# Patient Record
Sex: MALE
[De-identification: names, ages, dates, MRNs, and addresses within clinical notes are randomized per-mention and may not be internally consistent; named-entity substitution may affect disease eponyms.]

---

## 2020-10-16 ENCOUNTER — HOSPITAL ENCOUNTER (EMERGENCY)
Dept: HOSPITAL 5 - ED | Age: 40
Discharge: HOME | End: 2020-10-16
Payer: COMMERCIAL

## 2020-10-16 VITALS — DIASTOLIC BLOOD PRESSURE: 86 MMHG | SYSTOLIC BLOOD PRESSURE: 139 MMHG

## 2020-10-16 DIAGNOSIS — M54.6: ICD-10-CM
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
## 2020-10-16 DIAGNOSIS — M79.642: ICD-10-CM
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
## 2020-10-16 DIAGNOSIS — Z88.8: ICD-10-CM

## 2020-10-16 DIAGNOSIS — Z79.899: ICD-10-CM

## 2020-10-16 DIAGNOSIS — F43.10: ICD-10-CM
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
## 2020-10-16 DIAGNOSIS — M54.2: Primary | ICD-10-CM
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
## 2020-10-16 PROCEDURE — 72040 X-RAY EXAM NECK SPINE 2-3 VW: CPT

## 2020-10-16 PROCEDURE — 72100 X-RAY EXAM L-S SPINE 2/3 VWS: CPT

## 2020-10-16 PROCEDURE — 82962 GLUCOSE BLOOD TEST: CPT
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
## 2021-01-11 ENCOUNTER — HOSPITAL ENCOUNTER (EMERGENCY)
Dept: HOSPITAL 5 - ED | Age: 41
Discharge: HOME | End: 2021-01-11
Payer: MEDICARE

## 2021-01-11 VITALS — SYSTOLIC BLOOD PRESSURE: 158 MMHG | DIASTOLIC BLOOD PRESSURE: 113 MMHG

## 2021-01-11 DIAGNOSIS — Z98.890: ICD-10-CM

## 2021-01-11 DIAGNOSIS — E11.9: ICD-10-CM

## 2021-01-11 DIAGNOSIS — Z88.8: ICD-10-CM

## 2021-01-11 DIAGNOSIS — Z79.899: ICD-10-CM

## 2021-01-11 DIAGNOSIS — F12.90: ICD-10-CM

## 2021-01-11 DIAGNOSIS — M25.561: Primary | ICD-10-CM

## 2021-01-11 DIAGNOSIS — F17.200: ICD-10-CM

## 2021-01-11 DIAGNOSIS — Z88.2: ICD-10-CM
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
## 2021-01-11 NOTE — XRAY REPORT
RIGHT KNEE, 3 VIEWS



INDICATION / CLINICAL INFORMATION:

pain.



COMPARISON:

None available.



FINDINGS:

Mild degenerative changes are present throughout the knee. No fracture or dislocation. Mild chondroca
lcinosis is noted in the medial and lateral menisci. Additionally, there are 3 osteochondral loose judy
dies projecting within the suprapatellar bursa.



IMPRESSION:

1. 3 osteochondral loose bodies. The largest body is 9 mm.

2. Mild chondrocalcinosis.

3. Mild degenerative change.



Signer Name: Helen Lacy MD 

Signed: 1/11/2021 6:33 AM

Workstation Name: [id number]